# Patient Record
Sex: FEMALE | Race: BLACK OR AFRICAN AMERICAN | Employment: FULL TIME | URBAN - METROPOLITAN AREA
[De-identification: names, ages, dates, MRNs, and addresses within clinical notes are randomized per-mention and may not be internally consistent; named-entity substitution may affect disease eponyms.]

---

## 2017-12-20 ENCOUNTER — HOSPITAL ENCOUNTER (OUTPATIENT)
Dept: NUTRITION | Age: 57
Discharge: HOME | End: 2017-12-20
Payer: COMMERCIAL

## 2017-12-20 PROCEDURE — 97802 MEDICAL NUTRITION INDIV IN: CPT
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
## 2018-04-06 ENCOUNTER — HOSPITAL ENCOUNTER (OUTPATIENT)
Dept: NUTRITION | Age: 58
Discharge: HOME | End: 2018-04-06
Payer: COMMERCIAL

## 2018-04-06 PROCEDURE — 97802 MEDICAL NUTRITION INDIV IN: CPT
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
## 2018-05-22 ENCOUNTER — HOSPITAL ENCOUNTER (OUTPATIENT)
Dept: NUTRITION | Age: 58
Discharge: HOME | End: 2018-05-22
Payer: COMMERCIAL

## 2018-05-22 PROCEDURE — 97803 MED NUTRITION INDIV SUBSEQ: CPT

## 2018-05-22 NOTE — PROGRESS NOTES
NUTRITION  FOLLOW-UP TREATMENT NOTE  Patient Name: Elliott Romero         Date: 2018  : 1960    YES Patient  Verified  Diagnosis: Obesity   In time:   2:30p           Out time:   3:15p   Total Treatment Time (min):   45     SUBJECTIVE/ASSESSMENT    Changes in medication or medical history? Any new allergies, surgeries or procedures? NO    If yes, update Summary List   Pt in for follow up doing well; exceeding half of her goals and making progress with the others. She has started looking at grams of crab, but not paying attention to serving size amount. The day after our previous follow up she started drinking one regular water in the morning and one at night. She tried one of her coworkers whole grain crackers, and said they were good, but she hasn't purchased them for herself yet. Pt is not living with her sister-in-law who was cooking for her anymore, but states that the people she is living with now cook too. Pt has not thought about a goal weight; 220# would be 10% BW loss from start last year, less than 200# could be a long term goal. She is worried about having clothes that fit after wt loss; states her pants are already falling off. Current Wt: 228.6 Previous Wt: 233 Wt Change: -4.4     Achievement of Goals: 1. Try to look at grams of Total carbohydrate and stay below 45g per meal. =in progress, revised  2. Pair carbohydrates (crackers) with protein (cheese, eggs, nuts) whenever possible. =met, continue  3. Add 1 bottle of regular water per day. =met, continue  4. Purchase whole grain crackers to have at work next time. =in progress, revised         Patient Education:  [x]  Review current plan with patient   []  Other:    Handouts/  Information Provided: []  Carbohydrates  [x]  Protein  []  Fiber  []  Serving Sizes  []  Fluids  []  Non-starchy veg []  Diabetes  []  Cholesterol  []  Sodium  []  SBGM  []  Food Journals  [x]  Others: Food label     New Patient Goals: 1.  Start looking at serving sizes in addition to grams of carbohydrates and focus on staying within your goal range of 30-45g per meal.   2. Try to switch to more lean types of meat (turkey sausage, 93% lean beef, fish). 3. Look for crackers with \"whole wheat/grain\" in the first 3 ingredients listed on the label.       PLAN    [x]  Continue on current plan []  Follow-up PRN   []  Discharge due to :    [x]  Next appt: Wed. 6/27/18 at 4:30pm     Dietitian: dAarsh Stokes MS, RD    Date: 5/22/2018 Time: 3:37 PM

## 2018-06-27 ENCOUNTER — APPOINTMENT (OUTPATIENT)
Dept: NUTRITION | Age: 58
End: 2018-06-27

## 2018-07-16 ENCOUNTER — HOSPITAL ENCOUNTER (OUTPATIENT)
Dept: NUTRITION | Age: 58
Discharge: HOME | End: 2018-07-16
Payer: COMMERCIAL

## 2018-07-16 PROCEDURE — 97803 MED NUTRITION INDIV SUBSEQ: CPT

## 2018-07-16 NOTE — PROGRESS NOTES
NUTRITION  FOLLOW-UP TREATMENT NOTE  Patient Name: Yogi Caceres         Date: 2018  : 1960    YES Patient  Verified  Diagnosis: Obesity   In time:   5p            Out time:   5:30p   Total Treatment Time (min):   30     SUBJECTIVE/ASSESSMENT    Changes in medication or medical history? Any new allergies, surgeries or procedures? YES    If yes, update Summary List   Pt in for follow up doing well; she continues to lose wt and is worried that she is losing too fast. Wt does seem to be coming off quickly, but is still within the healthy range of about 1-2# per week. Pt is almost to long-term goal of 200#, at which point we will move into wt maintenance phase. She reports that she has a thyroid problem and she takes medicine every morning 30 mins before eating. She is being monitored for this issue and her next visit is in September. She also has a regular doctor's apmt in Sep.  Pt has met most goals; having whole grains and increasing lean protein sources especially fish. She admits she has not tried to purchase higher percent lean ground beef - but has only bought ground beef once since follow up. She has not been paying attention to grams of total carb on food label and reports that she is not eating 3 meals every day. Wt maintenance phase may be a challenge for pt to eat more food when she does not have much of an appetite. Current Wt: 210.7 Previous Wt: 228.6 Wt Change: 17.9     Achievement of Goals: 1. Start looking at serving sizes in addition to grams of carbohydrates and focus on staying within your goal range of 30-45g per meal. =not met, continue  2. Try to switch to more lean types of meat (turkey sausage, 93% lean beef, fish). =in progress, continue  3.  Look for crackers with \"whole wheat/grain\" in the first 3 ingredients listed on the label. =met, continue         Patient Education:  [x]  Review current plan with patient   []  Other:    Handouts/  Information Provided: [] Carbohydrates  []  Protein  []  Fiber  []  Serving Sizes  []  Fluids  []  Non-starchy veg []  Diabetes  []  Cholesterol  []  Sodium  []  SBGM  []  Food Journals  []  Others:      New Patient Goals: 1. Try to look at grams of total carbs and add them to have 30-45g per meal.   2. Try not to go longer than 5-6 hours without eating     PLAN    [x]  Continue on current plan []  Follow-up PRN   []  Discharge due to :    [x]  Next appt: Thurs.  10/4/18 at 3:30pm     Dietitian: Maria De Jesus Grey MS, RD    Date: 7/16/2018 Time: 5:11 PM